# Patient Record
Sex: FEMALE | Race: WHITE | NOT HISPANIC OR LATINO | Employment: OTHER | URBAN - METROPOLITAN AREA
[De-identification: names, ages, dates, MRNs, and addresses within clinical notes are randomized per-mention and may not be internally consistent; named-entity substitution may affect disease eponyms.]

---

## 2023-10-12 ENCOUNTER — HOSPITAL ENCOUNTER (EMERGENCY)
Facility: HOSPITAL | Age: 83
Discharge: OTHER INSTITUTION | End: 2023-10-12
Attending: EMERGENCY MEDICINE
Payer: MEDICARE

## 2023-10-12 ENCOUNTER — APPOINTMENT (OUTPATIENT)
Dept: RADIOLOGY | Facility: HOSPITAL | Age: 83
End: 2023-10-12
Payer: MEDICARE

## 2023-10-12 ENCOUNTER — APPOINTMENT (OUTPATIENT)
Dept: CARDIOLOGY | Facility: HOSPITAL | Age: 83
End: 2023-10-12
Payer: MEDICARE

## 2023-10-12 VITALS
RESPIRATION RATE: 16 BRPM | WEIGHT: 123.9 LBS | BODY MASS INDEX: 24.32 KG/M2 | HEART RATE: 59 BPM | SYSTOLIC BLOOD PRESSURE: 108 MMHG | HEIGHT: 60 IN | TEMPERATURE: 98.4 F | OXYGEN SATURATION: 98 % | DIASTOLIC BLOOD PRESSURE: 74 MMHG

## 2023-10-12 DIAGNOSIS — I21.4 NSTEMI (NON-ST ELEVATED MYOCARDIAL INFARCTION) (MULTI): Primary | ICD-10-CM

## 2023-10-12 DIAGNOSIS — E87.6 HYPOKALEMIA: ICD-10-CM

## 2023-10-12 DIAGNOSIS — I95.89 OTHER HYPOTENSION: ICD-10-CM

## 2023-10-12 DIAGNOSIS — I95.9 HYPOTENSION, UNSPECIFIED HYPOTENSION TYPE: ICD-10-CM

## 2023-10-12 DIAGNOSIS — R53.1 GENERALIZED WEAKNESS: ICD-10-CM

## 2023-10-12 LAB
ALBUMIN SERPL-MCNC: 3.9 G/DL (ref 3.5–5)
ALP BLD-CCNC: 167 U/L (ref 35–125)
ALT SERPL-CCNC: 12 U/L (ref 5–40)
AMMONIA PLAS-SCNC: 25 UMOL/L (ref 12–45)
ANION GAP SERPL CALC-SCNC: 17 MMOL/L
AST SERPL-CCNC: 26 U/L (ref 5–40)
BASOPHILS # BLD AUTO: 0.02 X10*3/UL (ref 0–0.1)
BASOPHILS NFR BLD AUTO: 0.1 %
BILIRUB SERPL-MCNC: 1.3 MG/DL (ref 0.1–1.2)
BUN SERPL-MCNC: 27 MG/DL (ref 8–25)
CALCIUM SERPL-MCNC: 8.2 MG/DL (ref 8.5–10.4)
CHLORIDE SERPL-SCNC: 96 MMOL/L (ref 97–107)
CO2 SERPL-SCNC: 19 MMOL/L (ref 24–31)
CREAT SERPL-MCNC: 1 MG/DL (ref 0.4–1.6)
EOSINOPHIL # BLD AUTO: 0.01 X10*3/UL (ref 0–0.4)
EOSINOPHIL NFR BLD AUTO: 0.1 %
ERYTHROCYTE [DISTWIDTH] IN BLOOD BY AUTOMATED COUNT: 25.3 % (ref 11.5–14.5)
FLUAV RNA RESP QL NAA+PROBE: NOT DETECTED
FLUBV RNA RESP QL NAA+PROBE: NOT DETECTED
GFR SERPL CREATININE-BSD FRML MDRD: 57 ML/MIN/1.73M*2
GLUCOSE BLD MANUAL STRIP-MCNC: 156 MG/DL (ref 74–99)
GLUCOSE SERPL-MCNC: 141 MG/DL (ref 65–99)
HCT VFR BLD AUTO: 31.2 % (ref 36–46)
HGB BLD-MCNC: 9.4 G/DL (ref 12–16)
IMM GRANULOCYTES # BLD AUTO: 0.11 X10*3/UL (ref 0–0.5)
IMM GRANULOCYTES NFR BLD AUTO: 0.8 % (ref 0–0.9)
LACTATE BLDV-SCNC: 1.3 MMOL/L (ref 0.4–2)
LACTATE BLDV-SCNC: 1.3 MMOL/L (ref 0.4–2)
LYMPHOCYTES # BLD AUTO: 0.31 X10*3/UL (ref 0.8–3)
LYMPHOCYTES NFR BLD AUTO: 2.3 %
MAGNESIUM SERPL-MCNC: 2.2 MG/DL (ref 1.6–3.1)
MCH RBC QN AUTO: 25.1 PG (ref 26–34)
MCHC RBC AUTO-ENTMCNC: 30.1 G/DL (ref 32–36)
MCV RBC AUTO: 83 FL (ref 80–100)
MONOCYTES # BLD AUTO: 0.92 X10*3/UL (ref 0.05–0.8)
MONOCYTES NFR BLD AUTO: 6.7 %
NEUTROPHILS # BLD AUTO: 12.29 X10*3/UL (ref 1.6–5.5)
NEUTROPHILS NFR BLD AUTO: 90 %
NRBC BLD-RTO: 0 /100 WBCS (ref 0–0)
PLATELET # BLD AUTO: 198 X10*3/UL (ref 150–450)
PMV BLD AUTO: 9.8 FL (ref 7.5–11.5)
POTASSIUM SERPL-SCNC: 3.3 MMOL/L (ref 3.4–5.1)
PROT SERPL-MCNC: 6.1 G/DL (ref 5.9–7.9)
RBC # BLD AUTO: 3.74 X10*6/UL (ref 4–5.2)
SARS-COV-2 RNA RESP QL NAA+PROBE: NOT DETECTED
SODIUM SERPL-SCNC: 132 MMOL/L (ref 133–145)
TROPONIN T SERPL-MCNC: 133 NG/L
TROPONIN T SERPL-MCNC: 628 NG/L
TROPONIN T SERPL-MCNC: 65 NG/L
TROPONIN T SERPL-MCNC: 658 NG/L
TSH SERPL DL<=0.05 MIU/L-ACNC: 1.8 MIU/L (ref 0.27–4.2)
WBC # BLD AUTO: 13.7 X10*3/UL (ref 4.4–11.3)

## 2023-10-12 PROCEDURE — 36415 COLL VENOUS BLD VENIPUNCTURE: CPT | Performed by: EMERGENCY MEDICINE

## 2023-10-12 PROCEDURE — 84443 ASSAY THYROID STIM HORMONE: CPT | Performed by: EMERGENCY MEDICINE

## 2023-10-12 PROCEDURE — 99221 1ST HOSP IP/OBS SF/LOW 40: CPT | Performed by: INTERNAL MEDICINE

## 2023-10-12 PROCEDURE — 96376 TX/PRO/DX INJ SAME DRUG ADON: CPT

## 2023-10-12 PROCEDURE — 93306 TTE W/DOPPLER COMPLETE: CPT | Performed by: INTERNAL MEDICINE

## 2023-10-12 PROCEDURE — 2500000001 HC RX 250 WO HCPCS SELF ADMINISTERED DRUGS (ALT 637 FOR MEDICARE OP): Performed by: EMERGENCY MEDICINE

## 2023-10-12 PROCEDURE — 84484 ASSAY OF TROPONIN QUANT: CPT | Performed by: EMERGENCY MEDICINE

## 2023-10-12 PROCEDURE — 82947 ASSAY GLUCOSE BLOOD QUANT: CPT

## 2023-10-12 PROCEDURE — 2500000004 HC RX 250 GENERAL PHARMACY W/ HCPCS (ALT 636 FOR OP/ED): Performed by: EMERGENCY MEDICINE

## 2023-10-12 PROCEDURE — 99285 EMERGENCY DEPT VISIT HI MDM: CPT | Performed by: EMERGENCY MEDICINE

## 2023-10-12 PROCEDURE — 83735 ASSAY OF MAGNESIUM: CPT | Performed by: EMERGENCY MEDICINE

## 2023-10-12 PROCEDURE — 99284 EMERGENCY DEPT VISIT MOD MDM: CPT | Mod: 25

## 2023-10-12 PROCEDURE — 83605 ASSAY OF LACTIC ACID: CPT | Performed by: EMERGENCY MEDICINE

## 2023-10-12 PROCEDURE — 71046 X-RAY EXAM CHEST 2 VIEWS: CPT | Mod: FY

## 2023-10-12 PROCEDURE — 82140 ASSAY OF AMMONIA: CPT | Performed by: EMERGENCY MEDICINE

## 2023-10-12 PROCEDURE — 80053 COMPREHEN METABOLIC PANEL: CPT | Performed by: EMERGENCY MEDICINE

## 2023-10-12 PROCEDURE — 85025 COMPLETE CBC W/AUTO DIFF WBC: CPT | Performed by: EMERGENCY MEDICINE

## 2023-10-12 PROCEDURE — 87636 SARSCOV2 & INF A&B AMP PRB: CPT | Performed by: EMERGENCY MEDICINE

## 2023-10-12 PROCEDURE — 93306 TTE W/DOPPLER COMPLETE: CPT

## 2023-10-12 PROCEDURE — 96361 HYDRATE IV INFUSION ADD-ON: CPT

## 2023-10-12 PROCEDURE — 93005 ELECTROCARDIOGRAM TRACING: CPT

## 2023-10-12 PROCEDURE — 96374 THER/PROPH/DIAG INJ IV PUSH: CPT

## 2023-10-12 RX ORDER — POTASSIUM CHLORIDE 20 MEQ/1
20 TABLET, EXTENDED RELEASE ORAL ONCE
Status: COMPLETED | OUTPATIENT
Start: 2023-10-12 | End: 2023-10-12

## 2023-10-12 RX ORDER — POTASSIUM CHLORIDE 20 MEQ/1
TABLET, EXTENDED RELEASE ORAL
COMMUNITY
Start: 2023-06-22

## 2023-10-12 RX ORDER — BUMETANIDE 1 MG/1
1 TABLET ORAL
COMMUNITY
Start: 2023-06-22

## 2023-10-12 RX ORDER — ACETAMINOPHEN 500 MG
5 TABLET ORAL
COMMUNITY
Start: 2022-11-15

## 2023-10-12 RX ORDER — HEPARIN SODIUM 10000 [USP'U]/100ML
0-4000 INJECTION, SOLUTION INTRAVENOUS CONTINUOUS
Status: DISCONTINUED | OUTPATIENT
Start: 2023-10-12 | End: 2023-10-12 | Stop reason: HOSPADM

## 2023-10-12 RX ORDER — ACETAMINOPHEN 500 MG
1000 TABLET ORAL EVERY 8 HOURS PRN
COMMUNITY
Start: 2022-11-15

## 2023-10-12 RX ORDER — ACETAMINOPHEN 500 MG
2000 TABLET ORAL
COMMUNITY
Start: 2023-03-22

## 2023-10-12 RX ORDER — ATORVASTATIN CALCIUM 10 MG/1
1 TABLET, FILM COATED ORAL NIGHTLY
COMMUNITY
Start: 2023-04-25 | End: 2024-04-24

## 2023-10-12 RX ORDER — BUMETANIDE 2 MG/1
TABLET ORAL
COMMUNITY
Start: 2023-06-22

## 2023-10-12 RX ORDER — HEPARIN SODIUM 5000 [USP'U]/ML
60 INJECTION, SOLUTION INTRAVENOUS; SUBCUTANEOUS ONCE
Status: COMPLETED | OUTPATIENT
Start: 2023-10-12 | End: 2023-10-12

## 2023-10-12 RX ORDER — GLYCOPYRROLATE AND FORMOTEROL FUMARATE 9; 4.8 UG/1; UG/1
2 AEROSOL, METERED RESPIRATORY (INHALATION) 2 TIMES DAILY
COMMUNITY
Start: 2023-05-26

## 2023-10-12 RX ORDER — METOLAZONE 2.5 MG/1
2.5 TABLET ORAL 2 TIMES WEEKLY
COMMUNITY
Start: 2023-06-22

## 2023-10-12 RX ORDER — ROPINIROLE 0.25 MG/1
0.25 TABLET, FILM COATED ORAL DAILY
COMMUNITY
Start: 2023-10-06

## 2023-10-12 RX ORDER — OMEPRAZOLE 20 MG/1
20 CAPSULE, DELAYED RELEASE ORAL
COMMUNITY
Start: 2023-07-18

## 2023-10-12 RX ORDER — FOLIC ACID 1 MG/1
1 TABLET ORAL
COMMUNITY
Start: 2023-06-08

## 2023-10-12 RX ORDER — DIGOXIN 125 MCG
1 TABLET ORAL DAILY
COMMUNITY
Start: 2023-02-21

## 2023-10-12 RX ORDER — HEPARIN SODIUM 5000 [USP'U]/ML
INJECTION, SOLUTION INTRAVENOUS; SUBCUTANEOUS AS NEEDED
Status: DISCONTINUED | OUTPATIENT
Start: 2023-10-12 | End: 2023-10-12 | Stop reason: HOSPADM

## 2023-10-12 RX ORDER — LANOLIN ALCOHOL/MO/W.PET/CERES
400 CREAM (GRAM) TOPICAL 2 TIMES DAILY
COMMUNITY
Start: 2023-09-29

## 2023-10-12 RX ORDER — DOCUSATE SODIUM 100 MG/1
100 CAPSULE, LIQUID FILLED ORAL EVERY 12 HOURS PRN
COMMUNITY
Start: 2022-11-15

## 2023-10-12 RX ORDER — NAPROXEN SODIUM 220 MG/1
324 TABLET, FILM COATED ORAL ONCE
Status: COMPLETED | OUTPATIENT
Start: 2023-10-12 | End: 2023-10-12

## 2023-10-12 RX ORDER — LIDOCAINE 560 MG/1
PATCH PERCUTANEOUS; TOPICAL; TRANSDERMAL
COMMUNITY
Start: 2023-03-14

## 2023-10-12 RX ORDER — TADALAFIL 20 MG/1
40 TABLET ORAL
COMMUNITY
Start: 2023-05-01

## 2023-10-12 RX ORDER — GABAPENTIN 400 MG/1
400 CAPSULE ORAL 3 TIMES DAILY
COMMUNITY
Start: 2023-06-27

## 2023-10-12 RX ORDER — LANOLIN ALCOHOL/MO/W.PET/CERES
1000 CREAM (GRAM) TOPICAL
COMMUNITY
Start: 2023-06-08

## 2023-10-12 RX ADMIN — POTASSIUM CHLORIDE 20 MEQ: 1500 TABLET, EXTENDED RELEASE ORAL at 07:07

## 2023-10-12 RX ADMIN — VASOPRESSIN 0.01 UNITS/MIN: 0.2 INJECTION INTRAVENOUS at 08:32

## 2023-10-12 RX ADMIN — SODIUM CHLORIDE 500 ML: 900 INJECTION, SOLUTION INTRAVENOUS at 02:39

## 2023-10-12 RX ADMIN — SODIUM CHLORIDE 500 ML: 900 INJECTION, SOLUTION INTRAVENOUS at 07:08

## 2023-10-12 RX ADMIN — VASOPRESSIN 0.01 UNITS/MIN: 0.2 INJECTION INTRAVENOUS at 08:22

## 2023-10-12 RX ADMIN — ASPIRIN 324 MG: 81 TABLET, CHEWABLE ORAL at 14:05

## 2023-10-12 RX ADMIN — HEPARIN SODIUM AND DEXTROSE 700 UNITS/HR: 10000; 5 INJECTION INTRAVENOUS at 14:00

## 2023-10-12 RX ADMIN — HEPARIN SODIUM 3350 UNITS: 5000 INJECTION, SOLUTION INTRAVENOUS; SUBCUTANEOUS at 13:57

## 2023-10-12 RX ADMIN — SODIUM CHLORIDE 500 ML: 900 INJECTION, SOLUTION INTRAVENOUS at 09:47

## 2023-10-12 ASSESSMENT — ENCOUNTER SYMPTOMS
CHEST TIGHTNESS: 0
LIGHT-HEADEDNESS: 0
HEADACHES: 0
DIFFICULTY URINATING: 0
ABDOMINAL PAIN: 0
PALPITATIONS: 0
CONFUSION: 0
NAUSEA: 0
AGITATION: 0
FLANK PAIN: 0
NUMBNESS: 0
FACIAL ASYMMETRY: 0
SORE THROAT: 0
DIAPHORESIS: 0
WHEEZING: 0
APPETITE CHANGE: 0
COUGH: 0
FATIGUE: 0
SHORTNESS OF BREATH: 0
DYSURIA: 0
DIZZINESS: 0
WEAKNESS: 1
FEVER: 0
CONSTIPATION: 0
DIARRHEA: 0
SINUS PAIN: 0
CHILLS: 1
FREQUENCY: 0
SINUS PRESSURE: 0

## 2023-10-12 ASSESSMENT — COLUMBIA-SUICIDE SEVERITY RATING SCALE - C-SSRS
1. IN THE PAST MONTH, HAVE YOU WISHED YOU WERE DEAD OR WISHED YOU COULD GO TO SLEEP AND NOT WAKE UP?: NO
6. HAVE YOU EVER DONE ANYTHING, STARTED TO DO ANYTHING, OR PREPARED TO DO ANYTHING TO END YOUR LIFE?: NO
2. HAVE YOU ACTUALLY HAD ANY THOUGHTS OF KILLING YOURSELF?: NO

## 2023-10-12 ASSESSMENT — PAIN - FUNCTIONAL ASSESSMENT: PAIN_FUNCTIONAL_ASSESSMENT: 0-10

## 2023-10-12 ASSESSMENT — PAIN SCALES - GENERAL: PAINLEVEL_OUTOF10: 0 - NO PAIN

## 2023-10-12 ASSESSMENT — PAIN DESCRIPTION - PROGRESSION: CLINICAL_PROGRESSION: GRADUALLY WORSENING

## 2023-10-12 NOTE — PROGRESS NOTES
"Juhi Alba is a 83 y.o. female on day 0 of admission presenting with No Principal Problem: There is no principal problem currently on the Problem List. Please update the Problem List and refresh..    Subjective   TCSW MET [facility] WITH PT'S RN AND HE STATES PT IS BEING TRANSFERRED DOWNTOWN.  TCSW MET [facility] WITH PT AND FAMILY AT BEDSIDE.  TCSW INTRODUCED SELF AND DISCUSSED ROLE.  FAMILY CONFIRMED PT IS BEING TRANSFERRED TO MAIN CAMPUS ICU.       Objective     Physical Exam    Last Recorded Vitals  Blood pressure 81/59, pulse 73, temperature 36.5 °C (97.7 °F), temperature source Oral, resp. rate 15, height 1.511 m (4' 11.5\"), weight 56.2 kg (123 lb 14.4 oz), SpO2 96 %.  Intake/Output last 3 Shifts:  No intake/output data recorded.    Relevant Results                             Assessment/Plan   Active Problems:  There are no active Hospital Problems.               Chinyere Mcclain LCSW      "

## 2023-10-12 NOTE — ED PROVIDER NOTES
"HPI   Chief Complaint   Patient presents with    Weakness, Gen    Nausea     Patient presents to  ED from home for progressive weakness and \"feeling ill with a fever\" since approximately 2200. Pt states she \"just doesn't feel right\". Pt seen by Pulmonology earlier yesterday and was feeling well.        83-year-old female with multiple medical problems including CHF, COPD, pulmonary hypertension, CAD, anemia, and atrial fibrillation presents to the emergency department with a complaint of generalized weakness.  Symptoms began shortly after her pulmonary neck visit yesterday.  There were no medication changes.  The patient states that she did not drink a lot of fluids throughout the day because she had to travel to her appointment.  Monday Wednesday Friday she takes a double dose of Bumex.  No complaints of chest pain, shortness of breath, nausea, or vomiting.  No complaints of slurred speech or localized weakness.  She has been compliant with all of her medication.  She states this has happened in the past before.      History provided by:  Patient and caregiver  Review of Systems   Constitutional:  Positive for chills. Negative for appetite change, diaphoresis, fatigue and fever.   HENT:  Negative for congestion, sinus pressure, sinus pain and sore throat.    Respiratory:  Negative for cough, chest tightness, shortness of breath and wheezing.    Cardiovascular:  Positive for leg swelling (Bilateral chronic). Negative for chest pain and palpitations.   Gastrointestinal:  Negative for abdominal pain, constipation, diarrhea and nausea.   Genitourinary:  Positive for decreased urine volume. Negative for difficulty urinating, dysuria, flank pain and frequency.   Neurological:  Positive for weakness (Generalized). Negative for dizziness, syncope, facial asymmetry, light-headedness, numbness and headaches.   Psychiatric/Behavioral:  Negative for agitation and confusion.                          No data recorded            "     Patient History   Past Medical History:   Diagnosis Date    Arthritis     CHF (congestive heart failure) (CMS/HCC)     COPD (chronic obstructive pulmonary disease) (CMS/HCC)     Depression     GERD (gastroesophageal reflux disease)     High cholesterol     Hypertension     Hypomagnesemia     Iron deficiency anemia     Osteopenia     PAD (peripheral artery disease) (CMS/HCC)     PAF (paroxysmal atrial fibrillation) (CMS/HCC)     Pulmonary hypertension (CMS/HCC)     Scoliosis deformity of spine     Spinal stenosis of lumbar region     Ulcerative (chronic) pancolitis without complications (CMS/HCC)      History reviewed. No pertinent surgical history.  No family history on file.  Social History     Tobacco Use    Smoking status: Unknown    Smokeless tobacco: Not on file   Vaping Use    Vaping Use: Unknown   Substance Use Topics    Alcohol use: Defer    Drug use: Never       Physical Exam   ED Triage Vitals [10/12/23 0221]   Temp Heart Rate Resp BP   36.5 °C (97.7 °F) (!) 114 23 82/51      SpO2 Temp Source Heart Rate Source Patient Position   92 % Oral Monitor Sitting      BP Location FiO2 (%)     Left arm --       Physical Exam  Vitals reviewed.   Constitutional:       General: She is not in acute distress.     Appearance: Normal appearance. She is not diaphoretic.   HENT:      Head: Normocephalic and atraumatic.      Mouth/Throat:      Mouth: Mucous membranes are dry.      Pharynx: Oropharynx is clear.   Cardiovascular:      Rate and Rhythm: Regular rhythm. Tachycardia present.   Pulmonary:      Effort: Pulmonary effort is normal.      Breath sounds: Normal breath sounds. No wheezing, rhonchi or rales.   Abdominal:      General: There is no distension.      Palpations: Abdomen is soft.      Tenderness: There is no abdominal tenderness.   Musculoskeletal:      Cervical back: Normal range of motion and neck supple.      Right lower leg: Edema present.      Left lower leg: Edema present.   Skin:     General: Skin is  warm and dry.      Coloration: Skin is not pale.   Neurological:      General: No focal deficit present.      Mental Status: She is alert and oriented to person, place, and time. Mental status is at baseline.   Psychiatric:         Mood and Affect: Mood normal.         Behavior: Behavior normal.         ED Course & MDM   ED Course as of 10/12/23 0721   Thu Oct 12, 2023   0515 Troponin T, High Sensitivity(!): 133 [TJ]      ED Course User Index  [TJ] Shena RASMUSSEN MD         Diagnoses as of 10/12/23 0721   NSTEMI (non-ST elevated myocardial infarction) (CMS/[facility])   Generalized weakness   Hypotension, unspecified hypotension type   Hypokalemia       Medical Decision Making  Generalized weakness with chills  Differential diagnosis: Viral illness, electrolyte abnormality, rule out ACS, rule out infection  On arrival to the emergency department if patient is hypotensive.  Daughter and patient states blood pressure runs on the lower end of normal but mostly the diastolic is typically in the 40s.  Her systolics are normally in the 110s.  IV fluid bolus 500 mL ordered along with laboratory studies.  Patient is declining CT of the head because she is unable to lay flat.  She has no focal neurologic deficits.  Lab results discussed at bedside.  Patient has a trending elevation of troponin.  Initially was 65.  Second troponin 133.  Chest x-ray which was reviewed and interpreted by myself pending official radiology report shows cardiomegaly with mild pulmonary vascular congestion.  No obvious pleural effusions, consolidations, pneumothorax.  Sternotomy rings appear to be intact.  12 lead EKG showed sinus tachycardia with a right bundle branch block nonspecific diffuse ST segment depressions.  There are no previous EKGs I am able to pull up in EMR for comparison.  4:57 AM  Pressure responded to 500 mL IV fluid bolus.  Systolics are in the 90s.  Currently the patient is asymptomatic.  Heart rate has decreased.  Patient typically  gets admitted at [facility].  All of her physicians are there.  Patient and daughter have provided me with their physicians names and specialist.  Pending case discussion with [facility] for transfer.  7:24 AM  Discussed with Dr. Daugherty intensivist at [facility].  Admission has been accepted.  She would like for procalcitonin level to be ordered.    Amount and/or Complexity of Data Reviewed  ECG/medicine tests: independent interpretation performed.        Procedure  Procedures     Shena RASMUSSEN MD  10/12/23 07

## 2023-10-12 NOTE — ED PROVIDER NOTES
please see Dr. Crawford's note for initial visit  and evaluation:     Patient waiting for transfer to [facility].  Evaluated last night for generalized weakness found to have a increasing troponin.  Initial troponin 52nd troponin 1135.  Not feeling well last night vasopressin drip.    Asked to see the patient because the patient has received 2 boluses of 500 normal saline along with vasopressin drip.  Patient's blood pressure was 85/59.  No improvement on vasopressin.      Dr. Mcbride had started vasopressin because of a low blood pressure.  Systolic blood pressure usually 110 with a diastolic of 40.    Reviewed evaluation and lab work.      Ordered additional bolus of 500 normal saline along with a switch from vasopressin to epinephrine to titrate for MAP of 65.      Ordered a repeat troponin, EKG and lactate.  Initial lactate 1.3     spoke with the patient and family.  Patient is well-appearing.  Her symptoms have improved compared to last night.  She feels fine.      Addendum: Appears peripheral line was obstructed and vasopressin was not infusing and the patient.  When the nurse fixed the peripheral line patient's blood pressure improved on vasopressin.    Canceled epinephrine.    Patient continues to be asymptomatic.  Reviewed labs ordered when I was told the patient was hypotensive on vasopressin.  Patient's troponin 600.  EKG did not show ischemia.  Lactate was discontinued by lab.      EKG:  interpreted by me, Emergency Department Physician    1.  Normal sinus rhythm 66, no ST elevations, inverted T waves in leads V1, II and III    Received copy of old EKG.  Old EKG from 2 AM shows sinus tachycardia heart rate 116 with ST depressions in the lateral leads    Started patient on a heparin.  Has not received Eliquis today.    Patient is no longer hypotensive on vasopressin.    Consulted cardiology Dr. Martínez who requested a bedside ultrasound in the ER.  Recommendations were to give her 500 mL boluses if  she becomes hypotensive.  Hold Bumex.    After call from hospitalist and cardiologist [facility] arrange for transportation of patient.  Waiting for room in the ICU.                   Jj Griffith MD  10/12/23 6282

## 2023-10-12 NOTE — PROGRESS NOTES
Patient was initially seen by my colleague and endorsed to me on signout.    Briefly, patient is an 83-year-old female that presented to the emergency department for evaluation of generalized weakness and nausea.  She was found to have an NSTEMI and is awaiting transfer to [facility] at this time.  Patient was accepted by Dr. Daugherty at [facility].  Patient did receive a bed and will be transferred by critical care.  Her blood pressure has stabilized at this time with most recent blood pressure of 108/74.

## 2023-10-12 NOTE — CONSULTS
Inpatient consult to Cardiology  Consult performed by: Geoff Clifton DO  Consult ordered by: Jj Griffith MD  Reason for consult: nstemi      History Of Present Illness:    Juhi Alba is a 83 y.o. female presenting with generalized malaise and dull chest pain.  She has a history of severe pulmonary hypertension on selexipag, subsequent severe right ventricular systolic dysfunction, severe tricuspid regurgitation and pulmonary insufficiency.  She has a history of atherosclerotic heart disease status post three-vessel bypass surgery in 2013.  She has a history of atrial fibrillation and is on oral anticoagulation in the form of Eliquis.  She presented to hospital very early this morning with rather sudden onset feeling of malaise and extreme weakness.  There was some dull chest pain that has since then subsided.  In the emergency department laboratory analysis shows's normal kidney function.  There is mild hypokalemia and hypernatremia.  High-sensitivity troponins are elevated at 133, 628, 658.  She has been hypotensive, responsive to IV fluid boluses.  She is on vasopressin.      She renders all of her care through the [facility], Dr. Cole is her pulmonologist who manages her pulmonary arterial hypertension (IPAH). Dr Chambers is her cardiologist.  She was seen by Dr. Cole last week and was doing well.  For the patient's request she has been placed in the transfer queue and is of highest priority level 1.      Last Recorded Vitals:  Vitals:    10/12/23 0836 10/12/23 1002 10/12/23 1100 10/12/23 1255   BP: 81/59 105/75 79/54 (!) 87/34   BP Location:  Left arm Left arm Left arm   Patient Position:  Lying Lying Lying   Pulse:  58 57 58   Resp: 15 16 18 14   Temp:       TempSrc:       SpO2: 96% 98% 98%    Weight:       Height:           Last Labs:  CBC - 10/12/2023:  2:03 AM  13.7 9.4 198    31.2      CMP - 10/12/2023:  2:03 AM  8.2 6.1 26 --- 1.3   _ 3.9 12 167      PTT - No results in last  "year.  _   _ _     Hemoglobin A1C   Date/Time Value Ref Range Status   09/07/2023 12:45 PM 6.8 (H) 4.3 - 5.6 % Final     Comment:     American Diabetes Association guidelines indicate that patients with HgbA1c in the range 5.7-6.4% are at increased risk for development of diabetes, and intervention by lifestyle modification may be beneficial. HgbA1c greater or equal to 6.5% is considered diagnostic of diabetes.   02/02/2023 03:34 PM 6.9 (H) 4.3 - 5.6 % Final     Comment:     American Diabetes Association guidelines indicate that patients with HgbA1c in the range 5.7-6.4% are at increased risk for development of diabetes, and intervention by lifestyle modification may be beneficial. HgbA1c greater or equal to 6.5% is considered diagnostic of diabetes.      Last I/O:  No intake/output data recorded.    Past Cardiology Tests (Last 3 Years):  EKG:  No results found for this or any previous visit from the past 1095 days.    Echo:  No results found for this or any previous visit from the past 1095 days.    Ejection Fractions:  No results found for: \"EF\"  Cath:  No results found for this or any previous visit from the past 1095 days.    Stress Test:  No results found for this or any previous visit from the past 1095 days.    Cardiac Imaging:  No results found for this or any previous visit from the past 1095 days.      Past Medical History:  She has a past medical history of Arthritis, CHF (congestive heart failure) (CMS/[facility]), COPD (chronic obstructive pulmonary disease) (CMS/[facility]), Depression, GERD (gastroesophageal reflux disease), High cholesterol, Hypertension, Hypomagnesemia, Iron deficiency anemia, Osteopenia, PAD (peripheral artery disease) (CMS/[facility]), PAF (paroxysmal atrial fibrillation) (CMS/[facility]), Pulmonary hypertension (CMS/[facility]), Scoliosis deformity of spine, Spinal stenosis of lumbar region, and Ulcerative (chronic) pancolitis without complications (CMS/[facility]).    Past Surgical History:  She has no past surgical " history on file.      Social History:  SheAlcohol use questions deferred to the physician. She reports that she does not use drugs. No history on file for tobacco use.    Family History:  No family history on file.     Allergies:  Ambrisentan; Azathioprine; Cyanocobalamin (vitamin b12); Iron ag,fum-c-fa-mv cmb11-calc; Riboflavin (vitamin b2); Thiamine (vitamin b1); and Vitamin b complex    Inpatient Medications:  Scheduled medications   Medication Dose Route Frequency     PRN medications   Medication   • heparin (porcine)     Continuous Medications   Medication Dose Last Rate   • heparin  0-4,000 Units/hr 700 Units/hr (10/12/23 1400)   • vasopressin  0.01-0.03 Units/min 0.01 Units/min (10/12/23 0832)     Outpatient Medications:  Current Outpatient Medications   Medication Instructions   • acetaminophen (TYLENOL) 1,000 mg, oral, Every 8 hours PRN   • apixaban (ELIQUIS) 2.5 mg, oral, 2 times daily   • atorvastatin (Lipitor) 10 mg tablet 1 tablet, oral, Nightly   • bumetanide (Bumex) 2 mg tablet Take 2 mg every MONDAY WEDNESDAY Friday, take 1 mg all other days   • bumetanide (BUMEX) 1 mg, oral, Daily RT   • cholecalciferol (VITAMIN D-3) 2,000 Units, oral, Daily RT   • cyanocobalamin (VITAMIN B-12) 1,000 mcg, oral   • digoxin (Lanoxin) 125 MCG tablet 1 tablet, oral, Daily   • docusate sodium (COLACE) 100 mg, oral, Every 12 hours PRN   • folic acid (FOLVITE) 1 mg, oral   • gabapentin (NEURONTIN) 400 mg, oral, 3 times daily   • glycopyrrolate-formoteroL (Bevespi Aerosphere) 9-4.8 mcg HFA aerosol inhaler 2 puffs, inhalation, 2 times daily   • lidocaine 4 % patch Apply 1 patch q12 hours prn pain   • magnesium oxide (MAG-OX) 400 mg, oral, 2 times daily   • melatonin 5 mg, oral, Daily RT   • metOLazone (ZAROXOLYN) 2.5 mg, oral, 2 times weekly   • omeprazole (PRILOSEC) 20 mg, oral, Daily RT   • potassium chloride CR 20 mEq ER tablet 60 meq daily And an additional 40mEq (2 tablets) on MWF only - (27 tablets per week)   •  rOPINIRole (REQUIP) 0.25 mg, oral, Daily, At bedtime<BR>   • selexipag (Uptravi) 200 mcg tablet Take 1 tab in AM and 2 tabs in PM   • tadalafil (CIALIS) 40 mg, oral, Daily RT   • VITAMIN B COMPLEX ORAL 1 tablet, oral, Daily       Physical Exam:   Gen: NAD   Neck: no JVD, carotid upstroke is brisk and without delay   Heart: Tachycardic, s1s2+ 3/6 holosystolic murmur left lower sternal border   Lungs: CTA   Ext: warm no edema     Assessment/Plan    NSTEMI: Precipitous rise in high-sensitivity troponin, chest discomfort, and ischemic changes on EKG.  Would be reasonable to assume that this represents acute coronary syndrome and would treat it as such.  Would initiate heparin per ACS protocol would stop her Eliquis for the time being.  I have arranged for a bedside echocardiogram, preliminary viewing of this echo shows severe RV dilation and dysfunction.  Her left ventricular size and function is preserved.  Formal read to follow.  Pulmonary arterial hypertension: Would continue her selexipag, her daughter has her home dose with her.  We will hold off on the PDE5 inhibitor given her hypotension.  Would continue to support her with judicious fluid boluses for hypotension.  I discussed with her and her daughter the concept of an early invasive strategy for this NSTEMI.  We will do everything to advocate for her transfer to the [facility] I have called the transfer line personally and they have assured me that she is of the highest priority.  We will continue to follow this patient while she is hospitalized in our institution.  Atrial fibrillation: Reasonable to continue digoxin.  Anticoagulation provided by therapeutic heparinization while Eliquis is on hold.              Peripheral IV 10/12/23 20 G Right Forearm (Active)   Site Assessment Clean;Dry;Intact 10/12/23 1001   Dressing Type Transparent;Securing device 10/12/23 1001   Number of days: 0       Code Status:  No Order    I spent 45 minutes in the professional  and overall care of this patient.        Geoff Clifton, DO

## 2023-10-13 ENCOUNTER — HOSPITAL ENCOUNTER (OUTPATIENT)
Dept: CARDIOLOGY | Facility: HOSPITAL | Age: 83
Discharge: HOME | End: 2023-10-13
Payer: MEDICARE

## 2023-10-13 LAB
ATRIAL RATE: 116 BPM
ATRIAL RATE: 192 BPM
P AXIS: -89 DEGREES
P AXIS: 85 DEGREES
P OFFSET: 174 MS
P ONSET: 118 MS
PR INTERVAL: 186 MS
Q ONSET: 207 MS
Q ONSET: 211 MS
QRS COUNT: 11 BEATS
QRS COUNT: 19 BEATS
QRS DURATION: 106 MS
QRS DURATION: 114 MS
QT INTERVAL: 412 MS
QT INTERVAL: 422 MS
QTC CALCULATION(BAZETT): 442 MS
QTC CALCULATION(BAZETT): 572 MS
QTC FREDERICIA: 436 MS
QTC FREDERICIA: 513 MS
R AXIS: 111 DEGREES
R AXIS: 120 DEGREES
T AXIS: 189 DEGREES
T AXIS: 60 DEGREES
T OFFSET: 417 MS
T OFFSET: 418 MS
VENTRICULAR RATE: 116 BPM
VENTRICULAR RATE: 66 BPM

## 2023-10-13 PROCEDURE — 93005 ELECTROCARDIOGRAM TRACING: CPT

## 2023-12-13 LAB — HOLD SPECIMEN: NORMAL
